# Patient Record
Sex: FEMALE | Race: WHITE | Employment: OTHER | URBAN - METROPOLITAN AREA
[De-identification: names, ages, dates, MRNs, and addresses within clinical notes are randomized per-mention and may not be internally consistent; named-entity substitution may affect disease eponyms.]

---

## 2017-12-12 ENCOUNTER — HOSPITAL ENCOUNTER (OUTPATIENT)
Age: 57
Setting detail: OBSERVATION
Discharge: HOME | End: 2017-12-14
Attending: EMERGENCY MEDICINE | Admitting: INTERNAL MEDICINE
Payer: OTHER GOVERNMENT

## 2017-12-12 ENCOUNTER — APPOINTMENT (OUTPATIENT)
Dept: CT IMAGING | Age: 57
End: 2017-12-12
Attending: EMERGENCY MEDICINE
Payer: OTHER GOVERNMENT

## 2017-12-12 DIAGNOSIS — Z98.890 H/O LITHOTRIPSY: ICD-10-CM

## 2017-12-12 DIAGNOSIS — S37.012A HEMATOMA OF LEFT KIDNEY, INITIAL ENCOUNTER: Primary | ICD-10-CM

## 2017-12-12 LAB
ALBUMIN SERPL-MCNC: 3.7 G/DL (ref 3.5–5)
ALBUMIN/GLOB SERPL: 1.1 {RATIO} (ref 1.1–2.2)
ALP SERPL-CCNC: 91 U/L (ref 45–117)
ALT SERPL-CCNC: 20 U/L (ref 12–78)
ANION GAP SERPL CALC-SCNC: 9 MMOL/L (ref 5–15)
APPEARANCE UR: ABNORMAL
AST SERPL-CCNC: 21 U/L (ref 15–37)
BACTERIA URNS QL MICRO: ABNORMAL /HPF
BASOPHILS # BLD: 0 K/UL (ref 0–0.1)
BASOPHILS NFR BLD: 0 % (ref 0–1)
BILIRUB SERPL-MCNC: 0.8 MG/DL (ref 0.2–1)
BILIRUB UR QL: NEGATIVE
BUN SERPL-MCNC: 16 MG/DL (ref 6–20)
BUN/CREAT SERPL: 17 (ref 12–20)
CALCIUM SERPL-MCNC: 8.6 MG/DL (ref 8.5–10.1)
CHLORIDE SERPL-SCNC: 101 MMOL/L (ref 97–108)
CO2 SERPL-SCNC: 26 MMOL/L (ref 21–32)
COLOR UR: ABNORMAL
CREAT SERPL-MCNC: 0.96 MG/DL (ref 0.55–1.02)
EOSINOPHIL # BLD: 0.2 K/UL (ref 0–0.4)
EOSINOPHIL NFR BLD: 2 % (ref 0–7)
EPITH CASTS URNS QL MICRO: ABNORMAL /LPF
ERYTHROCYTE [DISTWIDTH] IN BLOOD BY AUTOMATED COUNT: 12.3 % (ref 11.5–14.5)
GLOBULIN SER CALC-MCNC: 3.3 G/DL (ref 2–4)
GLUCOSE SERPL-MCNC: 96 MG/DL (ref 65–100)
GLUCOSE UR STRIP.AUTO-MCNC: NEGATIVE MG/DL
HCT VFR BLD AUTO: 25.9 % (ref 35–47)
HGB BLD-MCNC: 8.5 G/DL (ref 11.5–16)
HGB UR QL STRIP: ABNORMAL
KETONES UR QL STRIP.AUTO: ABNORMAL MG/DL
LEUKOCYTE ESTERASE UR QL STRIP.AUTO: ABNORMAL
LYMPHOCYTES # BLD: 2 K/UL (ref 0.8–3.5)
LYMPHOCYTES NFR BLD: 23 % (ref 12–49)
MCH RBC QN AUTO: 30.5 PG (ref 26–34)
MCHC RBC AUTO-ENTMCNC: 32.8 G/DL (ref 30–36.5)
MCV RBC AUTO: 92.8 FL (ref 80–99)
MONOCYTES # BLD: 0.9 K/UL (ref 0–1)
MONOCYTES NFR BLD: 11 % (ref 5–13)
NEUTS SEG # BLD: 5.6 K/UL (ref 1.8–8)
NEUTS SEG NFR BLD: 64 % (ref 32–75)
NITRITE UR QL STRIP.AUTO: NEGATIVE
PH UR STRIP: 6.5 [PH] (ref 5–8)
PLATELET # BLD AUTO: 308 K/UL (ref 150–400)
POTASSIUM SERPL-SCNC: 3.7 MMOL/L (ref 3.5–5.1)
PROT SERPL-MCNC: 7 G/DL (ref 6.4–8.2)
PROT UR STRIP-MCNC: 100 MG/DL
RBC # BLD AUTO: 2.79 M/UL (ref 3.8–5.2)
RBC #/AREA URNS HPF: ABNORMAL /HPF (ref 0–5)
SODIUM SERPL-SCNC: 136 MMOL/L (ref 136–145)
SP GR UR REFRACTOMETRY: 1.01 (ref 1–1.03)
TROPONIN I SERPL-MCNC: <0.04 NG/ML
UA: UC IF INDICATED,UAUC: ABNORMAL
UROBILINOGEN UR QL STRIP.AUTO: 1 EU/DL (ref 0.2–1)
WBC # BLD AUTO: 8.7 K/UL (ref 3.6–11)
WBC URNS QL MICRO: ABNORMAL /HPF (ref 0–4)

## 2017-12-12 PROCEDURE — 74011636320 HC RX REV CODE- 636/320: Performed by: EMERGENCY MEDICINE

## 2017-12-12 PROCEDURE — 36415 COLL VENOUS BLD VENIPUNCTURE: CPT | Performed by: EMERGENCY MEDICINE

## 2017-12-12 PROCEDURE — 96360 HYDRATION IV INFUSION INIT: CPT

## 2017-12-12 PROCEDURE — 81001 URINALYSIS AUTO W/SCOPE: CPT | Performed by: EMERGENCY MEDICINE

## 2017-12-12 PROCEDURE — 80053 COMPREHEN METABOLIC PANEL: CPT | Performed by: EMERGENCY MEDICINE

## 2017-12-12 PROCEDURE — 74176 CT ABD & PELVIS W/O CONTRAST: CPT

## 2017-12-12 PROCEDURE — 99285 EMERGENCY DEPT VISIT HI MDM: CPT

## 2017-12-12 PROCEDURE — 71275 CT ANGIOGRAPHY CHEST: CPT

## 2017-12-12 PROCEDURE — 84484 ASSAY OF TROPONIN QUANT: CPT | Performed by: EMERGENCY MEDICINE

## 2017-12-12 PROCEDURE — 96361 HYDRATE IV INFUSION ADD-ON: CPT

## 2017-12-12 PROCEDURE — 85025 COMPLETE CBC W/AUTO DIFF WBC: CPT | Performed by: EMERGENCY MEDICINE

## 2017-12-12 PROCEDURE — 87086 URINE CULTURE/COLONY COUNT: CPT | Performed by: EMERGENCY MEDICINE

## 2017-12-12 PROCEDURE — 74011250636 HC RX REV CODE- 250/636: Performed by: EMERGENCY MEDICINE

## 2017-12-12 PROCEDURE — 93005 ELECTROCARDIOGRAM TRACING: CPT

## 2017-12-12 RX ORDER — SODIUM CHLORIDE 9 MG/ML
50 INJECTION, SOLUTION INTRAVENOUS
Status: COMPLETED | OUTPATIENT
Start: 2017-12-12 | End: 2017-12-12

## 2017-12-12 RX ORDER — SODIUM CHLORIDE 0.9 % (FLUSH) 0.9 %
10 SYRINGE (ML) INJECTION
Status: COMPLETED | OUTPATIENT
Start: 2017-12-12 | End: 2017-12-12

## 2017-12-12 RX ADMIN — IOPAMIDOL 100 ML: 755 INJECTION, SOLUTION INTRAVENOUS at 22:52

## 2017-12-12 RX ADMIN — Medication 10 ML: at 22:52

## 2017-12-12 RX ADMIN — SODIUM CHLORIDE 50 ML/HR: 900 INJECTION, SOLUTION INTRAVENOUS at 22:52

## 2017-12-12 NOTE — IP AVS SNAPSHOT
[address] [facility] 
[phone] Patient: Chelsey Steen MRN: [id number] EGI:5/5/8876 You are allergic to the following Allergen Reactions Paxil (Paroxetine Hcl) Swelling Recent Documentation Height Weight Breastfeeding? BMI OB Status Smoking Status 1.6 m 84.9 kg No 33.16 kg/m2 Postmenopausal Never Smoker Emergency Contacts  (Rel.) Home Phone Work Phone Mobile Phone Bimal Leigh [phone] -- [phone] About your hospitalization You were admitted on:  December 13, 2017 You last received care in the:  [address] You were discharged on:  December 14, 2017 Why you were hospitalized Your primary diagnosis was:  Not on File Your diagnoses also included:  Uti (Urinary Tract Infection) Providers Seen During Your Hospitalization Provider Specialty Primary office phone Umm Escamilla MD Emergency Medicine [phone] Teddy Bonilla MD Internal Medicine [phone] Your Primary Care Physician (PCP) Primary Care Physician Office Phone Office Fax UNKNOWN, PROVIDER ** None ** ** None ** Follow-up Information Follow up With Details Comments Contact Info Provider Unknown   Patient not available to ask My Medications STOP taking these medications HYDROcodone-acetaminophen 5-325 mg per tablet Commonly known as:  NORCO  
   
  
 ibuprofen 600 mg tablet Commonly known as:  MOTRIN  
   
  
 MACROBID 100 mg capsule Generic drug:  nitrofurantoin (macrocrystal-monohydrate) PYRIDIUM 100 mg tablet Generic drug:  phenazopyridine TAKE these medications as instructed Instructions Each Dose to Equal  
 Morning Noon Evening Bedtime  
 cephALEXin 500 mg capsule Commonly known as:  Carlene Mcqueen Your last dose was: Your next dose is: Take 1 Cap by mouth four (4) times daily for 3 days. 500 mg  
    
   
   
   
  
 COLACE 100 mg capsule Generic drug:  docusate sodium Your last dose was: Your next dose is: Take 100 mg by mouth two (2) times a day. 100 mg  
    
   
   
   
  
 CYMBALTA 60 mg capsule Generic drug:  DULoxetine Your last dose was: Your next dose is: Take 60 mg by mouth daily. 60 mg DETROL LA 4 mg ER capsule Generic drug:  tolterodine ER Your last dose was: Your next dose is: Take 4 mg by mouth as needed. 4 mg  
    
   
   
   
  
 dilTIAZem 30 mg tablet Commonly known as:  CARDIZEM Your last dose was: Your next dose is: Take 30 mg by mouth. 30 mg  
    
   
   
   
  
 gabapentin 300 mg capsule Commonly known as:  NEURONTIN Your last dose was: Your next dose is: Take 300 mg by mouth three (3) times daily. 300 mg  
    
   
   
   
  
 hydroxychloroquine 200 mg tablet Commonly known as:  PLAQUENIL Your last dose was: Your next dose is: Take 400 mg by mouth daily. 400 mg  
    
   
   
   
  
 oxyCODONE-acetaminophen 5-325 mg per tablet Commonly known as:  PERCOCET Your last dose was: Your next dose is: Take 1 Tab by mouth every four (4) hours as needed. Max Daily Amount: 6 Tabs. 1 Tab  
    
   
   
   
  
 synthroid 50 mcg tablet Generic drug:  levothyroxine Your last dose was: Your next dose is: Take  by mouth Daily (before breakfast). traMADol 50 mg tablet Commonly known as:  ULTRAM  
   
Your last dose was: Your next dose is: Take 100 mg by mouth every six (6) hours as needed for Pain. 100 mg  
    
   
   
   
  
 TYLENOL 325 mg tablet Generic drug:  acetaminophen Your last dose was: Your next dose is: Take  by mouth every four (4) hours as needed for Pain. Where to Get Your Medications Information on where to get these meds will be given to you by the nurse or doctor. ! Ask your nurse or doctor about these medications  
  cephALEXin 500 mg capsule  
 oxyCODONE-acetaminophen 5-325 mg per tablet Discharge Instructions HOSPITALIST DISCHARGE INSTRUCTIONS 
 
NAME: Noemy Arora :  1960 MRN:  [id number] Date/Time:  2017 3:17 PM 
 
ADMIT DATE: 2017 DISCHARGE DATE: 2017 · It is important that you take the medication exactly as they are prescribed. · Keep your medication in the bottles provided by the pharmacist and keep a list of the medication names, dosages, and times to be taken in your wallet. · Do not take other medications without consulting your doctor. What to do at [facility] Recommended diet:  Regular Diet Recommended activity: Activity as tolerated If you have questions regarding the hospital related prescriptions or hospital related issues please call [facility] at [phone]. You can always direct your questions to your primary care doctor if you are unable to reach your hospital physician; your PCP works as an extension of your hospital doctor just like your hospital doctor is an extension of your PCP for your time at the hospital [facility]) If you experience any of the following symptoms then please call your primary care physician or return to the emergency room if you cannot get hold of your doctor: 
 
Fever, chills, nausea, vomiting, or persistent diarrhea Worsening weakness or new problems with your speech or balance Dark stools or visible blood in your stools New Leg swelling or shortness of breath as these could be signs of a clot Additional Instructions: Look out for increased back or abdominal pain, fevers or blood in your urine. If this occurs, you can contact [address] [place] [address] Urology or return to the ER. Recommend repeat CT or imaging in 3 month Monitor your BP and call your doctor if it is persistently over 160/95 Information obtained by : 
I understand that if any problems occur once I am at home I am to contact my physician. I understand and acknowledge receipt of the instructions indicated above. Physician's or R.N.'s Signature                                                                  Date/Time Patient or Representative Signature Discharge Orders None [employer] Announcement We are excited to announce that we are making your provider's discharge notes available to you in [employer]. You will see these notes when they are completed and signed by the physician that discharged you from your recent hospital stay. If you have any questions or concerns about any information you see in [employer], please call the Health Information Department where you were seen or reach out to your Primary Care Provider for more information about your plan of care. Introducing [facility]! [facility] introduces [employer] patient portal. Now you can access parts of your medical record, email your doctor's office, and request medication refills online. 1. In your internet browser, go to [email or web address] 2. Click on the First Time User? Click Here link in the Sign In box. You will see the New Member Sign Up page. 3. Enter your [employer] Access Code exactly as it appears below.  You will not need to use this code after youve completed the sign-up process. If you do not sign up before the expiration date, you must request a new code. · [employer] Access Code: [email or web address] Expires: 3/12/2018  6:54 PM 
 
4. Enter the last four digits of your Social Security Number (xxxx) and Date of Birth (mm/dd/yyyy) as indicated and click Submit. You will be taken to the next sign-up page. 5. Create a [employer] ID. This will be your [employer] login ID and cannot be changed, so think of one that is secure and easy to remember. 6. Create a [employer] password. You can change your password at any time. 7. Enter your Password Reset Question and Answer. This can be used at a later time if you forget your password. 8. Enter your e-mail address. You will receive e-mail notification when new information is available in [address] 9. Click Sign Up. You can now view and download portions of your medical record. 10. Click the Download Summary menu link to download a portable copy of your medical information. If you have questions, please visit the Frequently Asked Questions section of the [employer] website. Remember, [employer] is NOT to be used for urgent needs. For medical emergencies, dial 911. Now available from your iPhone and Android! General Information Please provide this summary of care documentation to your next provider. Patient Signature:  ____________________________________________________________ Date:  ____________________________________________________________  
  
Mili Krueger Provider Signature:  ____________________________________________________________ Date:  ____________________________________________________________

## 2017-12-13 PROBLEM — N39.0 UTI (URINARY TRACT INFECTION): Status: ACTIVE | Noted: 2017-12-13

## 2017-12-13 LAB
ATRIAL RATE: 86 BPM
CALCULATED P AXIS, ECG09: -13 DEGREES
CALCULATED R AXIS, ECG10: 30 DEGREES
CALCULATED T AXIS, ECG11: 36 DEGREES
DIAGNOSIS, 93000: NORMAL
HCT VFR BLD AUTO: 26.1 % (ref 35–47)
HGB BLD-MCNC: 8.8 G/DL (ref 11.5–16)
P-R INTERVAL, ECG05: 130 MS
Q-T INTERVAL, ECG07: 380 MS
QRS DURATION, ECG06: 74 MS
QTC CALCULATION (BEZET), ECG08: 454 MS
VENTRICULAR RATE, ECG03: 86 BPM

## 2017-12-13 PROCEDURE — 96361 HYDRATE IV INFUSION ADD-ON: CPT

## 2017-12-13 PROCEDURE — 74011250636 HC RX REV CODE- 250/636: Performed by: INTERNAL MEDICINE

## 2017-12-13 PROCEDURE — 96360 HYDRATION IV INFUSION INIT: CPT

## 2017-12-13 PROCEDURE — 99218 HC RM OBSERVATION: CPT

## 2017-12-13 PROCEDURE — 36415 COLL VENOUS BLD VENIPUNCTURE: CPT | Performed by: INTERNAL MEDICINE

## 2017-12-13 PROCEDURE — 74011250637 HC RX REV CODE- 250/637: Performed by: INTERNAL MEDICINE

## 2017-12-13 PROCEDURE — 85018 HEMOGLOBIN: CPT | Performed by: INTERNAL MEDICINE

## 2017-12-13 PROCEDURE — 74011000258 HC RX REV CODE- 258: Performed by: INTERNAL MEDICINE

## 2017-12-13 RX ORDER — NALOXONE HYDROCHLORIDE 0.4 MG/ML
0.4 INJECTION, SOLUTION INTRAMUSCULAR; INTRAVENOUS; SUBCUTANEOUS AS NEEDED
Status: DISCONTINUED | OUTPATIENT
Start: 2017-12-13 | End: 2017-12-14 | Stop reason: HOSPADM

## 2017-12-13 RX ORDER — LEVOTHYROXINE SODIUM 50 UG/1
TABLET ORAL
COMMUNITY

## 2017-12-13 RX ORDER — GABAPENTIN 300 MG/1
300 CAPSULE ORAL 3 TIMES DAILY
Status: DISCONTINUED | OUTPATIENT
Start: 2017-12-13 | End: 2017-12-14 | Stop reason: HOSPADM

## 2017-12-13 RX ORDER — HYDROCODONE BITARTRATE AND ACETAMINOPHEN 5; 325 MG/1; MG/1
1 TABLET ORAL
COMMUNITY
End: 2017-12-14

## 2017-12-13 RX ORDER — ONDANSETRON 2 MG/ML
4 INJECTION INTRAMUSCULAR; INTRAVENOUS
Status: DISCONTINUED | OUTPATIENT
Start: 2017-12-13 | End: 2017-12-14 | Stop reason: HOSPADM

## 2017-12-13 RX ORDER — IBUPROFEN 600 MG/1
600 TABLET ORAL
COMMUNITY
End: 2017-12-14

## 2017-12-13 RX ORDER — OXYCODONE AND ACETAMINOPHEN 5; 325 MG/1; MG/1
1 TABLET ORAL
Status: DISCONTINUED | OUTPATIENT
Start: 2017-12-13 | End: 2017-12-14 | Stop reason: HOSPADM

## 2017-12-13 RX ORDER — BISACODYL 5 MG
5 TABLET, DELAYED RELEASE (ENTERIC COATED) ORAL DAILY PRN
Status: DISCONTINUED | OUTPATIENT
Start: 2017-12-13 | End: 2017-12-14 | Stop reason: HOSPADM

## 2017-12-13 RX ORDER — ACETAMINOPHEN 325 MG/1
TABLET ORAL
COMMUNITY

## 2017-12-13 RX ORDER — HYDROXYCHLOROQUINE SULFATE 200 MG/1
400 TABLET, FILM COATED ORAL DAILY
COMMUNITY

## 2017-12-13 RX ORDER — GABAPENTIN 300 MG/1
300 CAPSULE ORAL 3 TIMES DAILY
COMMUNITY

## 2017-12-13 RX ORDER — DOCUSATE SODIUM 100 MG/1
100 CAPSULE, LIQUID FILLED ORAL 2 TIMES DAILY
COMMUNITY

## 2017-12-13 RX ORDER — DILTIAZEM HYDROCHLORIDE 30 MG/1
30 TABLET, FILM COATED ORAL
COMMUNITY

## 2017-12-13 RX ORDER — SODIUM CHLORIDE 9 MG/ML
50 INJECTION, SOLUTION INTRAVENOUS CONTINUOUS
Status: DISPENSED | OUTPATIENT
Start: 2017-12-13 | End: 2017-12-13

## 2017-12-13 RX ORDER — PHENAZOPYRIDINE HYDROCHLORIDE 100 MG/1
100 TABLET, FILM COATED ORAL
COMMUNITY
End: 2017-12-14

## 2017-12-13 RX ORDER — OXYBUTYNIN CHLORIDE 5 MG/1
10 TABLET, EXTENDED RELEASE ORAL DAILY
Status: DISCONTINUED | OUTPATIENT
Start: 2017-12-13 | End: 2017-12-14 | Stop reason: HOSPADM

## 2017-12-13 RX ORDER — HYDROMORPHONE HYDROCHLORIDE 2 MG/ML
1 INJECTION, SOLUTION INTRAMUSCULAR; INTRAVENOUS; SUBCUTANEOUS
Status: DISCONTINUED | OUTPATIENT
Start: 2017-12-13 | End: 2017-12-14 | Stop reason: HOSPADM

## 2017-12-13 RX ORDER — DILTIAZEM HYDROCHLORIDE 30 MG/1
30 TABLET, FILM COATED ORAL
Status: DISCONTINUED | OUTPATIENT
Start: 2017-12-13 | End: 2017-12-14 | Stop reason: HOSPADM

## 2017-12-13 RX ORDER — TRAMADOL HYDROCHLORIDE 50 MG/1
100 TABLET ORAL
COMMUNITY

## 2017-12-13 RX ORDER — LEVOTHYROXINE SODIUM 50 UG/1
50 TABLET ORAL
Status: DISCONTINUED | OUTPATIENT
Start: 2017-12-13 | End: 2017-12-14 | Stop reason: HOSPADM

## 2017-12-13 RX ORDER — SODIUM CHLORIDE 0.9 % (FLUSH) 0.9 %
5-10 SYRINGE (ML) INJECTION EVERY 8 HOURS
Status: DISCONTINUED | OUTPATIENT
Start: 2017-12-13 | End: 2017-12-14 | Stop reason: HOSPADM

## 2017-12-13 RX ORDER — DULOXETIN HYDROCHLORIDE 60 MG/1
60 CAPSULE, DELAYED RELEASE ORAL DAILY
COMMUNITY

## 2017-12-13 RX ORDER — HYDROXYCHLOROQUINE SULFATE 200 MG/1
400 TABLET, FILM COATED ORAL DAILY
Status: DISCONTINUED | OUTPATIENT
Start: 2017-12-13 | End: 2017-12-14 | Stop reason: HOSPADM

## 2017-12-13 RX ORDER — SODIUM CHLORIDE 0.9 % (FLUSH) 0.9 %
5-10 SYRINGE (ML) INJECTION AS NEEDED
Status: DISCONTINUED | OUTPATIENT
Start: 2017-12-13 | End: 2017-12-14 | Stop reason: HOSPADM

## 2017-12-13 RX ORDER — HYDROMORPHONE HYDROCHLORIDE 2 MG/ML
1 INJECTION, SOLUTION INTRAMUSCULAR; INTRAVENOUS; SUBCUTANEOUS
Status: DISCONTINUED | OUTPATIENT
Start: 2017-12-13 | End: 2017-12-13

## 2017-12-13 RX ORDER — DOCUSATE SODIUM 100 MG/1
100 CAPSULE, LIQUID FILLED ORAL 2 TIMES DAILY
Status: DISCONTINUED | OUTPATIENT
Start: 2017-12-13 | End: 2017-12-14 | Stop reason: HOSPADM

## 2017-12-13 RX ORDER — ACETAMINOPHEN 325 MG/1
650 TABLET ORAL
Status: DISCONTINUED | OUTPATIENT
Start: 2017-12-13 | End: 2017-12-14 | Stop reason: HOSPADM

## 2017-12-13 RX ORDER — TOLTERODINE 4 MG/1
4 CAPSULE, EXTENDED RELEASE ORAL AS NEEDED
COMMUNITY

## 2017-12-13 RX ORDER — DULOXETIN HYDROCHLORIDE 30 MG/1
60 CAPSULE, DELAYED RELEASE ORAL DAILY
Status: DISCONTINUED | OUTPATIENT
Start: 2017-12-13 | End: 2017-12-14 | Stop reason: HOSPADM

## 2017-12-13 RX ORDER — HYDROMORPHONE HYDROCHLORIDE 2 MG/ML
0.5 INJECTION, SOLUTION INTRAMUSCULAR; INTRAVENOUS; SUBCUTANEOUS
Status: DISCONTINUED | OUTPATIENT
Start: 2017-12-13 | End: 2017-12-13

## 2017-12-13 RX ORDER — NITROFURANTOIN 25; 75 MG/1; MG/1
100 CAPSULE ORAL 2 TIMES DAILY
COMMUNITY
End: 2017-12-14

## 2017-12-13 RX ORDER — HYDROMORPHONE HYDROCHLORIDE 2 MG/ML
1 INJECTION, SOLUTION INTRAMUSCULAR; INTRAVENOUS; SUBCUTANEOUS
Status: DISCONTINUED | OUTPATIENT
Start: 2017-12-13 | End: 2017-12-13 | Stop reason: SDUPTHER

## 2017-12-13 RX ADMIN — GABAPENTIN 300 MG: 300 CAPSULE ORAL at 16:10

## 2017-12-13 RX ADMIN — OXYCODONE HYDROCHLORIDE AND ACETAMINOPHEN 1 TABLET: 5; 325 TABLET ORAL at 23:40

## 2017-12-13 RX ADMIN — DULOXETINE HYDROCHLORIDE 60 MG: 30 CAPSULE, DELAYED RELEASE ORAL at 10:13

## 2017-12-13 RX ADMIN — GABAPENTIN 300 MG: 300 CAPSULE ORAL at 10:13

## 2017-12-13 RX ADMIN — DOCUSATE SODIUM 100 MG: 100 CAPSULE, LIQUID FILLED ORAL at 10:13

## 2017-12-13 RX ADMIN — Medication 10 ML: at 23:43

## 2017-12-13 RX ADMIN — OXYCODONE HYDROCHLORIDE AND ACETAMINOPHEN 1 TABLET: 5; 325 TABLET ORAL at 07:59

## 2017-12-13 RX ADMIN — OXYBUTYNIN CHLORIDE 10 MG: 5 TABLET, FILM COATED, EXTENDED RELEASE ORAL at 10:13

## 2017-12-13 RX ADMIN — GABAPENTIN 300 MG: 300 CAPSULE ORAL at 23:40

## 2017-12-13 RX ADMIN — CEFTRIAXONE 1 G: 1 INJECTION, POWDER, FOR SOLUTION INTRAMUSCULAR; INTRAVENOUS at 16:10

## 2017-12-13 RX ADMIN — DILTIAZEM HYDROCHLORIDE 30 MG: 30 TABLET, FILM COATED ORAL at 04:36

## 2017-12-13 RX ADMIN — HYDROXYCHLOROQUINE SULFATE 400 MG: 200 TABLET, FILM COATED ENTERAL at 10:13

## 2017-12-13 RX ADMIN — OXYCODONE HYDROCHLORIDE AND ACETAMINOPHEN 1 TABLET: 5; 325 TABLET ORAL at 13:29

## 2017-12-13 RX ADMIN — SODIUM CHLORIDE 50 ML/HR: 900 INJECTION, SOLUTION INTRAVENOUS at 04:37

## 2017-12-13 RX ADMIN — OXYCODONE HYDROCHLORIDE AND ACETAMINOPHEN 1 TABLET: 5; 325 TABLET ORAL at 18:03

## 2017-12-13 RX ADMIN — DILTIAZEM HYDROCHLORIDE 30 MG: 30 TABLET, FILM COATED ORAL at 23:40

## 2017-12-13 NOTE — ED NOTES
Oncology nurse not available to take report at this time as she is on phone w/ MD per unit secretary.

## 2017-12-13 NOTE — ED PROVIDER NOTES
EMERGENCY DEPARTMENT HISTORY AND PHYSICAL EXAM      Date: 12/12/2017  Patient Name: Noemy Arora    History of Presenting Illness     Chief Complaint   Patient presents with    Flank Pain     Pt ambulatory to triage with c/o L flank pain-states having lithotripsy on 12/8, has had pain since; has noticed \"a little\" blood in urine since surgery    Fatigue     generalized fatigue, intermittent fevers at home since surgery; last had Tylenol today at 1500     Nausea     intermittent x 2 days     History Provided By: Patient    HPI: Noemy Arora, 62 y.o. female with PMHx significant for fibromyalgia, spinal stenosis, and RA, IBS, presents ambulatory to the ED with cc of gradual onset, intermittent, moderate left flank pain with fatigue,chills, and a low grade fever of 100.9 F that began on 12/9/2017. Pt states that she had a left lithotripsy with stent placement on 12/8/2017 and flew from [place] to [place] the next day. Pt was intubated for the surgery. She was discharged with Tylenol, Norco, and Macrobid, though notes that she has not been taking Norco. Pt reports that the flank pain is exacerbated with deep breathing. She had SOB on 12/9/2017. Yesterday, pt had moderate midsternal chest pain which has now resolved. Pt was told some chest pain would be normal postop. She also notes some hematuria and decreased appetite. Her last BM was today. Pt reports having the a right lithotripsy 1 year ago without any subsequent sxs. She denies a history of MI, DVT/PE. Pt specifically denies current SOB, nausea, vomiting, diarrhea, cough, or chest pain. PCP: PROVIDER UNKNOWN     Social Hx: - Tobacco, - EtOH, - Illicit Drugs    There are no other complaints, changes, or physical findings at this time. Past History     Past Medical History:  No past medical history on file. Past Surgical History:  No past surgical history on file. Family History:  No family history on file.     Social History:  Social History Substance Use Topics    Smoking status: Not on file    Smokeless tobacco: Not on file    Alcohol use No     Allergies: Allergies   Allergen Reactions    Paxil [Paroxetine Hcl] Swelling     Review of Systems   Review of Systems   Constitutional: Positive for appetite change (decreased), chills, fatigue and fever (low grade). HENT: Negative for congestion, rhinorrhea and sore throat. Eyes: Negative for pain, discharge and visual disturbance. Respiratory: Positive for shortness of breath (resolved). Negative for cough, chest tightness and wheezing. Cardiovascular: Positive for chest pain (mid, resolved). Negative for palpitations and leg swelling. Gastrointestinal: Negative for abdominal pain, constipation, diarrhea, nausea and vomiting. Genitourinary: Positive for flank pain (left sided). Negative for dysuria, frequency and hematuria. Musculoskeletal: Negative for arthralgias, back pain and myalgias. Skin: Negative for rash. Neurological: Negative for dizziness, weakness, light-headedness and headaches. Psychiatric/Behavioral: Negative. Physical Exam   Physical Exam   Constitutional: She is oriented to person, place, and time. She appears well-developed and well-nourished. No distress. HENT:   Head: Normocephalic and atraumatic. Eyes: EOM are normal. Right eye exhibits no discharge. Left eye exhibits no discharge. No scleral icterus. Neck: Normal range of motion. Neck supple. No tracheal deviation present. Cardiovascular: Normal rate, regular rhythm, normal heart sounds and intact distal pulses. Exam reveals no gallop and no friction rub. No murmur heard. Pulmonary/Chest: Effort normal and breath sounds normal. No respiratory distress. She has no wheezes. She has no rales. Abdominal: Soft. She exhibits no distension. There is tenderness in the epigastric area and left upper quadrant.    Genitourinary:   Genitourinary Comments: Left flank pain   Musculoskeletal: Normal range of motion. She exhibits no edema. Lymphadenopathy:     She has no cervical adenopathy. Neurological: She is alert and oriented to person, place, and time. No focal neuro deficits   Skin: Skin is warm and dry. No rash noted. Psychiatric: She has a normal mood and affect. Nursing note and vitals reviewed. Diagnostic Study Results     Labs -     Recent Results (from the past 12 hour(s))   URINALYSIS W/ REFLEX CULTURE    Collection Time: 12/12/17  6:13 PM   Result Value Ref Range    Color YELLOW/STRAW      Appearance CLOUDY (A) CLEAR      Specific gravity 1.010 1.003 - 1.030      pH (UA) 6.5 5.0 - 8.0      Protein 100 (A) NEG mg/dL    Glucose NEGATIVE  NEG mg/dL    Ketone TRACE (A) NEG mg/dL    Bilirubin NEGATIVE  NEG      Blood LARGE (A) NEG      Urobilinogen 1.0 0.2 - 1.0 EU/dL    Nitrites NEGATIVE  NEG      Leukocyte Esterase MODERATE (A) NEG      WBC 10-20 0 - 4 /hpf    RBC  0 - 5 /hpf    Epithelial cells FEW FEW /lpf    Bacteria 1+ (A) NEG /hpf    UA:UC IF INDICATED URINE CULTURE ORDERED (A) CNI     CBC WITH AUTOMATED DIFF    Collection Time: 12/12/17  6:14 PM   Result Value Ref Range    WBC 8.7 3.6 - 11.0 K/uL    RBC 2.79 (L) 3.80 - 5.20 M/uL    HGB 8.5 (L) 11.5 - 16.0 g/dL    HCT 25.9 (L) 35.0 - 47.0 %    MCV 92.8 80.0 - 99.0 FL    MCH 30.5 26.0 - 34.0 PG    MCHC 32.8 30.0 - 36.5 g/dL    RDW 12.3 11.5 - 14.5 %    PLATELET 570 675 - 173 K/uL    NEUTROPHILS 64 32 - 75 %    LYMPHOCYTES 23 12 - 49 %    MONOCYTES 11 5 - 13 %    EOSINOPHILS 2 0 - 7 %    BASOPHILS 0 0 - 1 %    ABS. NEUTROPHILS 5.6 1.8 - 8.0 K/UL    ABS. LYMPHOCYTES 2.0 0.8 - 3.5 K/UL    ABS. MONOCYTES 0.9 0.0 - 1.0 K/UL    ABS. EOSINOPHILS 0.2 0.0 - 0.4 K/UL    ABS.  BASOPHILS 0.0 0.0 - 0.1 K/UL   METABOLIC PANEL, COMPREHENSIVE    Collection Time: 12/12/17  6:14 PM   Result Value Ref Range    Sodium 136 136 - 145 mmol/L    Potassium 3.7 3.5 - 5.1 mmol/L    Chloride 101 97 - 108 mmol/L    CO2 26 21 - 32 mmol/L    Anion gap 9 5 - 15 mmol/L    Glucose 96 65 - 100 mg/dL    BUN 16 6 - 20 MG/DL    Creatinine 0.96 0.55 - 1.02 MG/DL    BUN/Creatinine ratio 17 12 - 20      GFR est AA >60 >60 ml/min/1.73m2    GFR est non-AA 60 (L) >60 ml/min/1.73m2    Calcium 8.6 8.5 - 10.1 MG/DL    Bilirubin, total 0.8 0.2 - 1.0 MG/DL    ALT (SGPT) 20 12 - 78 U/L    AST (SGOT) 21 15 - 37 U/L    Alk. phosphatase 91 45 - 117 U/L    Protein, total 7.0 6.4 - 8.2 g/dL    Albumin 3.7 3.5 - 5.0 g/dL    Globulin 3.3 2.0 - 4.0 g/dL    A-G Ratio 1.1 1.1 - 2.2     TROPONIN I    Collection Time: 12/12/17  6:14 PM   Result Value Ref Range    Troponin-I, Qt. <0.04 <0.05 ng/mL   EKG, 12 LEAD, INITIAL    Collection Time: 12/12/17  8:49 PM   Result Value Ref Range    Ventricular Rate 86 BPM    Atrial Rate 86 BPM    P-R Interval 130 ms    QRS Duration 74 ms    Q-T Interval 380 ms    QTC Calculation (Bezet) 454 ms    Calculated P Axis -13 degrees    Calculated R Axis 30 degrees    Calculated T Axis 36 degrees    Diagnosis       Normal sinus rhythm  Normal ECG  No previous ECGs available       Radiologic Studies -     CT Results  (Last 48 hours)               12/12/17 2252  CT ABD PELV WO CONT Final result    Impression:  IMPRESSION:    1. Likely left perirenal hematoma which causes mass effect upon the left kidney. 2. There is a reconstituted double-J ureteral stent in the left renal pelvis and   in the bladder. 3. Nephrolithiasis. 4. Incidental findings as above. Narrative:  EXAM:  CT ABDOMEN PELVIS WITHOUT CONTRAST   INDICATION:  left flank pain, recent lithotripsy with ureteral stent   COMPARISON: None. .   TECHNIQUE:    Unenhanced multislice helical CT was performed from the diaphragm to the   symphysis pubis without intravenous contrast administration. Contiguous 5 mm   axial images were reconstructed and lung and soft tissue windows were generated. Coronal and sagittal reformations were generated.     CT dose reduction was achieved through use of a standardized protocol tailored   for this examination and automatic exposure control for dose modulation. [facility] FINDINGS:   INCIDENTALLY IMAGED CHEST:   Heart/vessels: Within normal limits. Lungs/Pleura: Minimal bibasilar scarring/atelectasis. .   ABDOMEN:   Liver: Within normal limits. Gallbladder/Biliary: Within normal limits. Spleen: Within normal limits. Pancreas: Within normal limits. Adrenals: Within normal limits. Kidneys: There is a crescent of irregular, high attenuation material surrounding   the left kidney with adjacent stranding in the perirenal space. There are a few   calcifications within this high attenuation material. Multiple calculi in the   left renal collecting system. Peritoneum/Mesenteries: Within normal limits. Extraperitoneum: Within normal limits. Gastrointestinal tract: Post surgical changes about the stomach. Minimal   diverticulosis without evidence of diverticulitis   Vascular: Calcifications in the aorta. [facility] PELVIS:   Extraperitoneum: Within normal limits. Ureters: There is a double-J ureteral stent reconstituted within the left renal   collecting system and in the bladder. Bladder: Within normal limits. Reproductive System: Within normal limits. .   MSK:    Levoscoliosis of the lumbar spine with associated degenerative changes. .       12/12/17 7955  CTA CHEST W OR W WO CONT Final result    Impression:  IMPRESSION:    1. No visualized pulmonary embolus. Narrative:  EXAM:  CTA CHEST W OR W WO CONT   INDICATION:   pleuritic chest pain, dyspnea, recent travel and surgery   COMPARISON: None. .   TECHNIQUE:    Precontrast  images were obtained to localize the volume for acquisition. Multislice helical CT arteriography was performed from the diaphragm to the   thoracic inlet during uneventful rapid bolus intravenous contrast   administration. Lung and soft tissue windows were generated.   Coronal and   sagittal images were generated and 3D post processing consisting of coronal   maximum intensity images was performed. CT dose reduction was achieved through use of a standardized protocol tailored   for this examination and automatic exposure control for dose modulation. Makayla Peterson FINDINGS:   CHEST:   Chest wall/thoracic inlet: Within normal limits. Thyroid: Within normal limits. Mediastinum/rainer: Patulous appearance of the esophagus. Heart/vessels: Within normal limits. Lungs/Pleura: Within normal limits. .   MSK:    Nonfocal degenerative changes throughout the spine. .             Medical Decision Making   I am the first provider for this patient. I reviewed the vital signs, available nursing notes, past medical history, past surgical history, family history and social history. Vital Signs-Reviewed the patient's vital signs. Patient Vitals for the past 12 hrs:   Temp Pulse Resp BP SpO2   12/12/17 1803 98.1 °F (36.7 °C) 93 16 138/76 100 %     Pulse Oximetry Analysis - 100% on RA    Cardiac Monitor:   Rate: 93 bpm  Rhythm: Normal Sinus Rhythm      EKG interpretation: 20:49  Rhythm: normal sinus rhythm; and regular . Rate (approx.): 86; Axis: normal; MA interval: normal; QRS interval: normal ; ST/T wave: normal.  Written by Ilsa Hanks ED Scribe, as dictated by Juan A Goodrich MD.    Records Reviewed: Nursing Notes    Provider Notes (Medical Decision Making):   Patient presents to ED with worsening left flank pain since recent lithotripsy and stent placement. CT a/p consistent with perirenal hematoma. CTA negative for PE. Troponin negative. Discussed with urology - will admit for observation, serial hemoglobin; urology will evaluate patient in the morning. ED Course:   Initial assessment performed. The patients presenting problems have been discussed, and they are in agreement with the care plan formulated and outlined with them.   I have encouraged them to ask questions as they arise throughout their visit.    CONSULT NOTE:  11:21 PM  Amanda Ware MD spoke with Dr. Regino Hashimoto,  Specialty: Urology   Discussed pt's hx, disposition, and available diagnostic and imaging results. Reviewed care plans. Consultant recommends hospitalist admission for observation, serial Hb. Will see tomorrow. CONSULT NOTE:  11:23 PM  Amanda Ware MD spoke with Dr. Lucrecia Miller,  Specialty: Hospitalist  Discussed pt's hx, disposition, and available diagnostic and imaging results. Reviewed care plans. Consultant will evaluate the patient for admission. Disposition:  Admit Note:  11:23 PM  Patient is being admitted to the hospital by Dr. Lucrecia Miller. The results of their tests and reasons for their admission have been discussed with them and/or available family. They convey agreement and understanding for the need to be admitted and for their admission diagnosis. Consultation has been made with the inpatient physician specialist for hospitalization. PLAN: Admit to Hospital     Diagnosis     Clinical Impression:   1. Hematoma of left kidney, initial encounter    2. H/O lithotripsy      Attestations:    Attestation: This note is prepared by Dee Zazueta, acting as Scribe for MD Amanad Rios MD: The scribe's documentation has been prepared under my direction and personally reviewed by me in its entirety. I confirm that the note above accurately reflects all work, treatment, procedures, and medical decision making performed by me.

## 2017-12-13 NOTE — PROGRESS NOTES
Rec'd pt on unit with  at bedside. Pt stated 8/10 pain but now 3/10 pain to L flank to abd area. Completed all admission paperwork and assessment. Call bell within reach.     Primary Nurse Malinda Carter RN and Jeaneth Marion RN performed a dual skin assessment on this patient No impairment noted  Tommy score is 23

## 2017-12-13 NOTE — ED NOTES
TRANSFER - OUT REPORT:    Verbal report given to Saleem RAGSDALE(name) on Creta Scriver  being transferred to Oncology(unit) for routine progression of care       Report consisted of patients Situation, Background, Assessment and   Recommendations(SBAR). Information from the following report(s) SBAR, ED Summary, [facility] and Recent Results was reviewed with the receiving nurse. Lines:   Peripheral IV 12/12/17 Left Antecubital (Active)   Site Assessment Clean, dry, & intact 12/12/2017  8:20 PM   Dressing Status Clean, dry, & intact 12/12/2017  8:20 PM        Opportunity for questions and clarification was provided.

## 2017-12-13 NOTE — ED NOTES
Bedside shift change report given to Hailey Robbins (oncoming nurse) by Elle Moore (offgoing nurse). Report included the following information SBAR, ED Summary, MAR and Recent Results.

## 2017-12-13 NOTE — PROGRESS NOTES
Oncology Nursing Communication Tool  3:23 PM  12/13/2017     Bedside shift change report given to Sanjuana Mcfarland RN (incoming nurse) by Collin Miguel RN (outgoing nurse) on [employer]. Report included the following information SBAR. Shift Summary:       Issues for physician to address: Oncology Shift Note   Admission Date 12/12/2017   Admission Diagnosis UTI (urinary tract infection)   Code Status Full Code   Consults IP CONSULT TO UROLOGY      Cardiac Monitoring [] Yes [x] No      Purposeful Hourly Rounding [x] Yes    Yanique Score Total Score: 0   Yanique score 3 or > [] Bed Alarm [] Avasys [] 1:1 sitter [] Patient refused (Place signed refusal form in chart)      Pain Managed [x] Yes [] No    Key Pain Meds             traMADol (ULTRAM) 50 mg tablet  (Taking) Take 100 mg by mouth every six (6) hours as needed for Pain. acetaminophen (TYLENOL) 325 mg tablet  (Taking) Take  by mouth every four (4) hours as needed for Pain. ibuprofen (MOTRIN) 600 mg tablet  (Taking) Take 600 mg by mouth every six (6) hours as needed for Pain. HYDROcodone-acetaminophen (NORCO) 5-325 mg per tablet Take 1 Tab by mouth every six (6) hours as needed for Pain. Influenza Vaccine Received Flu Vaccine for Current Season (usually Sept-March): Yes           Oxygen needs? [] Room air Oxygen @  []1L    []2L    []3L   []4L    []5L   []6L     Use home O2? [] Yes [] No  Perform O2 challenge test using  smartphrase (.oxygenchallenge)      Last bowel movement    bowel movement      Urinary Catheter             LDAs               Peripheral IV 12/12/17 Left Antecubital (Active)   Site Assessment Clean, dry, & intact 12/13/2017 10:56 AM   Phlebitis Assessment 0 12/13/2017 10:56 AM   Infiltration Assessment 0 12/13/2017 10:56 AM   Dressing Status Clean, dry, & intact 12/13/2017 10:56 AM   Dressing Type Tape;Transparent 12/13/2017 10:56 AM   Hub Color/Line Status Pink; Infusing 12/13/2017 10:56 AM Readmission Risk Assessment Tool Score Low Risk            3       Total Score        3 Has Seen PCP in Last 6 Months (Yes=3, No=0)        Criteria that do not apply:    . Living with Significant Other. Assisted Living. LTAC. SNF. or   Rehab    Patient Length of Stay (>5 days = 3)    IP Visits Last 12 Months (1-3=4, 4=9, >4=11)    Pt.  Coverage (Medicare=5 , Medicaid, or Self-Pay=4)    Charlson Comorbidity Score (Age + Comorbid Conditions)       Expected Length of Stay - - -   Actual Length of Stay 1          Ronak Walters RN

## 2017-12-13 NOTE — PROGRESS NOTES
Chart reviewed. Patient seen. She feels better. Repeat Hg pending. BP stable, not tachycardic. Will advance diet.

## 2017-12-13 NOTE — CONSULTS
Requesting Provider: Zane Solomon MD  Reason for Consultation: \"perinephric hematoma with mass effect\"  Pre-existing [facility] Patient:   No                Patient: Mark Shaikh MRN: [id number]  SSN: [id number]    YOB: 1960  Age: 62 y.o. Sex: female     Location: [address] [place] [address]       Code Status: Full Code   PCP: PROVIDER UNKNOWN  - None   Emergency Contact:  Primary Emergency Contact: Jordan Kelley, Home Phone: [phone]   Race/Mu-ism/Language: [facility] / Stan People / [place]   Payor: Payor: Mk Zamora / Plan: Luis Bring DEPENDENTS / Product Type: Saratha Hemp /    Prior Admission Data:         Hospitalized:  Hospital Day: 2 - Admitted 12/12/2017  8:15 PM   POD # * No surgery found *  by * Surgery not found * - Blood Loss: * No surgery found * * Surgery not found *     CONSULTANTS  IP CONSULT TO UROLOGY   ADMISSION DIAGNOSES    ICD-10-CM ICD-9-CM   1. Hematoma of left kidney, initial encounter S37.012A 866.01   2. H/O lithotripsy Z98.890 V15.29         Assessment/Plan:     Perinephric hematoma following L ESWL on 12/8/17 in [place] concerning for UTI    -trend H/H and transfuse PRN  -monitor BP and vital signs  -continue left ureteral stent during acute phase to prevent complicating issues like ureteral obstruction  -ucx pending, agree with empiric abx  -reimage if she develops worsening pain, fever >101.5, abd distension  -if becomes hemodynamically unstable or did not respond appropriately to transfusions, would need IR selective embolization. CC: Flank Pain (Pt ambulatory to triage with c/o L flank pain-states having lithotripsy on 12/8, has had pain since; has noticed \"a little\" blood in urine since surgery); Fatigue (generalized fatigue, intermittent fevers at home since surgery; last had Tylenol today at 1500 ); and Nausea (intermittent x 2 days)   HPI: She is a 62 y.o. female who presented with left flank pain following L ESWL 12/8/17 in [place]. She reports feeling left flank pain and malaise on POD 1. She attempted to manage symptoms with tylenol. She denies taking blood thinning medications like NSAIDs or ASA. She flew to [place] from [address] [place] [address] to care for her mother yesterday. She presented to ER for these persistent symptoms. She had CT which revealed perinephric hematoma. She denies temperature greater than 100.9. She denies frequency, urgency, dysuria, gross hematuria. She does have a feeling of pressure attributed to the stent. The left flank pain has been stable, not worsened since onset. She also reports abdominal distension which she though was related to constipation from narcotics so stopped taking them. Temp (24hrs), Av °F (36.7 °C), Min:97.8 °F (36.6 °C), Max:98.1 °F (36.7 °C)    Creatinine   Date/Time Value Ref Range Status   2017 06:14 PM 0.96 0.55 - 1.02 MG/DL Final     Current Antimicrobial Therapy (168h ago through future)    Ordered     Start Stop    17 0310  cefTRIAXone (ROCEPHIN) 1 g in 0.9% sodium chloride (MBP/ADV) 50 mL  1 g,   IntraVENous,   EVERY 24 HOURS      17 0300 --    17 0308  hydroxychloroquine (PLAQUENIL) tablet 400 mg  400 mg,   Oral,   DAILY     Comments:  OP SIG:Take 400 mg by mouth daily.       17 0900 --        Diet: DIET CLEAR LIQUID -    Urinary Status: Voiding, Bathroom privileges     Labs     Lab Results   Component Value Date/Time    WBC 8.7 2017 06:14 PM    HCT 25.9 2017 06:14 PM    PLATELET 652 8 06:14 PM    Sodium 136 2017 06:14 PM    Potassium 3.7 2017 06:14 PM    Chloride 101 2017 06:14 PM    CO2 26 2017 06:14 PM    BUN 16 2017 06:14 PM    Creatinine 0.96 2017 06:14 PM    Glucose 96 2017 06:14 PM    Calcium 8.6 2017 06:14 PM     UA: Lab Results   Component Value Date/Time    Color YELLOW/STRAW 2017 06:13 PM    Appearance CLOUDY 2017 06:13 PM    Specific gravity 1.010 2017 06:13 PM pH (UA) 6.5 12/12/2017 06:13 PM    Protein 100 12/12/2017 06:13 PM    Glucose NEGATIVE  12/12/2017 06:13 PM    Ketone TRACE 12/12/2017 06:13 PM    Bilirubin NEGATIVE  12/12/2017 06:13 PM    Urobilinogen 1.0 12/12/2017 06:13 PM    Nitrites NEGATIVE  12/12/2017 06:13 PM    Leukocyte Esterase MODERATE 12/12/2017 06:13 PM    Epithelial cells FEW 12/12/2017 06:13 PM    Bacteria 1+ 12/12/2017 06:13 PM    WBC 10-20 12/12/2017 06:13 PM    RBC  12/12/2017 06:13 PM     Imaging     Results for orders placed during the hospital encounter of 12/12/17   CTA CHEST W OR W WO CONT    Narrative EXAM:  CTA CHEST W OR W WO CONT  INDICATION:   pleuritic chest pain, dyspnea, recent travel and surgery  COMPARISON: None. .  TECHNIQUE:   Precontrast  images were obtained to localize the volume for acquisition. Multislice helical CT arteriography was performed from the diaphragm to the  thoracic inlet during uneventful rapid bolus intravenous contrast  administration. Lung and soft tissue windows were generated. Coronal and  sagittal images were generated and 3D post processing consisting of coronal  maximum intensity images was performed. CT dose reduction was achieved through use of a standardized protocol tailored  for this examination and automatic exposure control for dose modulation. Levorn Mercado FINDINGS:  CHEST:  Chest wall/thoracic inlet: Within normal limits. Thyroid: Within normal limits. Mediastinum/rainer: Patulous appearance of the esophagus. Heart/vessels: Within normal limits. Lungs/Pleura: Within normal limits. .  MSK:   Nonfocal degenerative changes throughout the spine. .    Impression IMPRESSION:   1. No visualized pulmonary embolus. US Results (most recent):  No results found for this or any previous visit.     Cultures     All Micro Results     Procedure Component Value Units Date/Time    CULTURE, URINE [[id number]] Collected:  12/12/17 1813    Order Status:  Completed Updated:  12/12/17 2225 Past History: (Complete 2+/3 areas)     Allergies   Allergen Reactions    Paxil [Paroxetine Hcl] Swelling      Current Facility-Administered Medications   Medication Dose Route Frequency    docusate sodium (COLACE) capsule 100 mg  100 mg Oral BID    DULoxetine (CYMBALTA) capsule 60 mg  60 mg Oral DAILY    gabapentin (NEURONTIN) capsule 300 mg  300 mg Oral TID    hydroxychloroquine (PLAQUENIL) tablet 400 mg  400 mg Oral DAILY    levothyroxine (SYNTHROID) tablet 50 mcg  50 mcg Oral ACB    oxybutynin chloride XL (DITROPAN XL) tablet 10 mg  10 mg Oral DAILY    sodium chloride (NS) flush 5-10 mL  5-10 mL IntraVENous Q8H    sodium chloride (NS) flush 5-10 mL  5-10 mL IntraVENous PRN    acetaminophen (TYLENOL) tablet 650 mg  650 mg Oral Q4H PRN    oxyCODONE-acetaminophen (PERCOCET) 5-325 mg per tablet 1 Tab  1 Tab Oral Q4H PRN    naloxone (NARCAN) injection 0.4 mg  0.4 mg IntraVENous PRN    ondansetron (ZOFRAN) injection 4 mg  4 mg IntraVENous Q4H PRN    bisacodyl (DULCOLAX) tablet 5 mg  5 mg Oral DAILY PRN    [START ON 12/14/2017] cefTRIAXone (ROCEPHIN) 1 g in 0.9% sodium chloride (MBP/ADV) 50 mL  1 g IntraVENous Q24H    nitroglycerin (NITROBID) 2 % ointment 1 Inch  1 Inch Topical Q6H PRN    0.9% sodium chloride infusion  50 mL/hr IntraVENous CONTINUOUS    dilTIAZem (CARDIZEM) IR tablet 30 mg  30 mg Oral QHS    HYDROmorphone (DILAUDID) injection 1 mg  1 mg IntraVENous Q3H PRN    Prior to Admission medications    Medication Sig Start Date End Date Taking? Authorizing Provider   levothyroxine (SYNTHROID) 50 mcg tablet Take  by mouth Daily (before breakfast). Yes Phys Other, MD   hydroxychloroquine (PLAQUENIL) 200 mg tablet Take 400 mg by mouth daily. Yes Phys Other, MD   DULoxetine (CYMBALTA) 60 mg capsule Take 60 mg by mouth daily. Yes Phys Other, MD   traMADol (ULTRAM) 50 mg tablet Take 100 mg by mouth every six (6) hours as needed for Pain.    Yes Phys Other, MD   gabapentin (NEURONTIN) 300 mg capsule Take 300 mg by mouth three (3) times daily. Yes Karon Bradley MD   dilTIAZem (CARDIZEM) 30 mg tablet Take 30 mg by mouth. Yes Karon Bradley MD   acetaminophen (TYLENOL) 325 mg tablet Take  by mouth every four (4) hours as needed for Pain. Yes Karon Bradley MD   ibuprofen (MOTRIN) 600 mg tablet Take 600 mg by mouth every six (6) hours as needed for Pain. Yes Karon Bradley MD   tolterodine ER (DETROL LA) 4 mg ER capsule Take 4 mg by mouth as needed. Yes Karon Bradley MD   docusate sodium (COLACE) 100 mg capsule Take 100 mg by mouth two (2) times a day. Yes Karon Bradley MD   HYDROcodone-acetaminophen (NORCO) 5-325 mg per tablet Take 1 Tab by mouth every six (6) hours as needed for Pain. Karon Bradley MD   nitrofurantoin, macrocrystal-monohydrate, (MACROBID) 100 mg capsule Take 100 mg by mouth two (2) times a day. Karon Bradley MD   phenazopyridine (PYRIDIUM) 100 mg tablet Take 100 mg by mouth three (3) times daily (after meals). Karon Bradley MD        PMHx:  has a past medical history of Alopecia; Autoimmune disorder ([facility] [address] Fibromyalgia; Hypothyroidism; IBS (irritable bowel syndrome); Plantar fasciitis of left foot; RA (rheumatoid arthritis) ([facility] [address] Renal stones; Spinal stenosis; and TTS (tarsal tunnel syndrome), left. PSurgHx:  has a past surgical history that includes gastric bypass;  section (); acl reconstruction (Right); and tonsillectomy (). and ESWL  PSocHx:  reports that she has never smoked. She has never used smokeless tobacco. She reports that she does not drink alcohol.    ROS:  (Complete - 10 systems) - positives are bolded : Weightloss (Constitutional), Dry mouth (ENMT), Chest pain (CV), SOB (Respiratory), Constipation (GI), Weakness (MS), Pallor (Skin), TIA Sx (Neuro), Confusion (Psych), Easy bruising (Heme)    Physical Exam: (Comprehesive - 8+ 1995 Systems)     (1) Constitutional:  FIO2:   on SpO2: O2 Sat (%): 100 %  O2 Device: Room air    Patient Vitals for the past 24 hrs:   BP Temp Pulse Resp SpO2 Height Weight   12/13/17 1056 135/81 97.8 °F (36.6 °C) 87 18 100 % - -   12/13/17 0700 131/69 - - - 91 % - -   12/13/17 0600 123/73 - - - 91 % - -   12/13/17 0500 117/75 - - - 94 % - -   12/13/17 0436 141/83 - 83 - - - -   12/13/17 0430 - - - - 97 % - -   12/13/17 0415 141/83 - - - 92 % - -   12/13/17 0400 142/76 - - - 93 % - -   12/13/17 0345 135/76 - - - 94 % - -   12/13/17 0330 147/65 - - - 97 % - -   12/13/17 0315 135/72 - - - 95 % - -   12/13/17 0300 137/74 - - - 92 % - -   12/13/17 0245 130/74 - - - 94 % - -   12/13/17 0230 138/71 - - - 91 % - -   12/13/17 0215 140/76 - - - 90 % - -   12/13/17 0200 135/75 - - - 90 % - -   12/13/17 0145 143/74 - - - 90 % - -   12/13/17 0130 137/70 - - - 90 % - -   12/13/17 0115 124/73 - - - 91 % - -   12/13/17 0030 139/76 - - - 93 % - -   12/13/17 0000 140/75 - - - 98 % - -   12/12/17 2345 - - - - 98 % - -   12/12/17 2330 125/71 - - - 96 % - -   12/12/17 2329 - - - - 98 % - -   12/12/17 2327 131/76 - - - - - -   12/12/17 1803 138/76 98.1 °F (36.7 °C) 93 16 100 % 5' 3\" (1.6 m) 84.9 kg (187 lb 2.7 oz)         Date 12/12/17 0700 - 12/13/17 0659 12/13/17 0700 - 12/14/17 0659   Shift 6031-9462 5361-4436 24 Hour Total 0700-1859 1900-0659 24 Hour Total   I  N  T  A  K  E   I.V.  (mL/kg/hr)    315.8  315.8      Volume (0.9% sodium chloride infusion)    315.8  315.8      Volume (cefTRIAXone (ROCEPHIN) 1 g in 0.9% sodium chloride (MBP/ADV) 50 mL)    0  0    Shift Total  (mL/kg)    315.8  (3.7)  315.8  (3.7)   O  U  T  P  U  T   Shift Total  (mL/kg)         NET    315.8  315.8   Weight (kg) 84.9 84.9 84.9 84.9 84.9 84.9      (2) ENMT:   moist mucous membranes   (3) Respiratory:  breathing easily, pain with deep inspiration   (4) GI:  no abdominal masses, no hepatomegaly, no ventral hernia, stool for occult blood not indicated as urologist.  R flank pain, R abd pain, no flank eccymosis   (5) :   deferred   (6) Lymphatic:  no adenopathy   (7) Muscloskeletal: no gross deformity   (8) Skin:  no rash   (9) Neuro:  no focal deficits    Gen: NAD    Signed By: Ginger Barahona MD  - December 13, 2017

## 2017-12-13 NOTE — H&P
Hospitalist Admission Note    NAME: Jenny Fang   :  1960   MRN:  [id number]     Date/Time:  2017 3:02 AM    Patient PCP: PROVIDER UNKNOWN  ________________________________________________________________________    My assessment of this patient's clinical condition and my plan of care is as follows. Assessment / Plan:  L nephrolithiasis with L perirenal hematoma s/p lithotripsy with stent placement 17:    - CT A/P with likely left perirenal hematoma which causes mass effect upon the left kidney. There is a reconstituted double-J ureteral stent in the left renal pelvis and in the bladder. Nephrolithiasis. - urology consulted for recommendations. Pt also told that her ureteral stent should be removed 5-7 days post-procedure and wonders if it can be removed. - serial Hg  - IV fluids  - will keep on clears for now  - percocet, dilaudid prn pain  UTI, present on admission:  - hold macrobid (taking outpt)  - emperic rocephin  - urine sent for culture  Hypothyroidism: con't synthroid  RA, fibromyalgia:  - con't plaquenil  - con't cymbalta, neurontin  Leg cramps: con't diltiazem at night    Code Status: Full  Surrogate Decision Maker:   DVT Prophylaxis: SCDs    Baseline: independent, [place] [address]. Here to help care for her mother who recently suffered a hip fracture. Subjective:   CHIEF COMPLAINT: L flank pain    HISTORY OF PRESENT ILLNESS:     Jenny Fang is a 62 y.o.  female who presents with above. Pt with lithotripsy last Friday with stent placement. She had this last year without difficulty, but this time experienced severe and worsening pain in her L flank over the last few days. She endorses low-grade fevers at home. She has worsening of her pain when taking a deep breath. She complains of constipation. She passed a large clot of blood in her urine on Saturday.   She has loss of appetite but not nausea. She denies SOB but did have some chest pain over the weekend. We were asked to admit for work up and evaluation of the above problems. Past Medical History:   Diagnosis Date    Alopecia     Autoimmune disorder ([facility]     undetermined    Fibromyalgia     Hypothyroidism     IBS (irritable bowel syndrome)     Plantar fasciitis of left foot     RA (rheumatoid arthritis) ([facility]     Renal stones     R 2016, L 2017    Spinal stenosis     c-spine, l-spine    TTS (tarsal tunnel syndrome), left     foot        Past Surgical History:   Procedure Laterality Date    HX ACL RECONSTRUCTION Right     HX  SECTION      HX GASTRIC BYPASS      sleeve    HX TONSILLECTOMY         Social History   Substance Use Topics    Smoking status: Never Smoker    Smokeless tobacco: Never Used    Alcohol use No        Family History   Problem Relation Age of Onset    Heart Attack Mother     Heart Disease Mother     Heart Disease Father      Allergies   Allergen Reactions    Paxil [Paroxetine Hcl] Swelling        Prior to Admission medications    Medication Sig Start Date End Date Taking? Authorizing Provider   levothyroxine (SYNTHROID) 50 mcg tablet Take  by mouth Daily (before breakfast). Yes Karon Bradley MD   hydroxychloroquine (PLAQUENIL) 200 mg tablet Take 400 mg by mouth daily. Yes Karon Bradley MD   DULoxetine (CYMBALTA) 60 mg capsule Take 60 mg by mouth daily. Yes Karon Bradley MD   traMADol (ULTRAM) 50 mg tablet Take 100 mg by mouth every six (6) hours as needed for Pain. Yes Karon Bradley MD   gabapentin (NEURONTIN) 300 mg capsule Take 300 mg by mouth three (3) times daily. Yes Karon Bradley MD   dilTIAZem (CARDIZEM) 30 mg tablet Take 30 mg by mouth. Yes Karon Bradley MD   acetaminophen (TYLENOL) 325 mg tablet Take  by mouth every four (4) hours as needed for Pain. Yes Karon Bradley MD   ibuprofen (MOTRIN) 600 mg tablet Take 600 mg by mouth every six (6) hours as needed for Pain.    Yes Phys Other, MD   HYDROcodone-acetaminophen (NORCO) 5-325 mg per tablet Take 1 Tab by mouth every six (6) hours as needed for Pain. Yes Karon Bradley MD   nitrofurantoin, macrocrystal-monohydrate, (MACROBID) 100 mg capsule Take 100 mg by mouth two (2) times a day. Yes Karon Bradley MD   tolterodine ER (DETROL LA) 4 mg ER capsule Take 4 mg by mouth as needed. Yes Karon Bradley MD   phenazopyridine (PYRIDIUM) 100 mg tablet Take 100 mg by mouth three (3) times daily (after meals). Yes Karon Bradley MD   docusate sodium (COLACE) 100 mg capsule Take 100 mg by mouth two (2) times a day. Yes Karon Bradley MD       REVIEW OF SYSTEMS:     I am not able to complete the review of systems because:    The patient is intubated and sedated    The patient has altered mental status due to his acute medical problems    The patient has baseline aphasia from prior stroke(s)    The patient has baseline dementia and is not reliable historian    The patient is in acute medical distress and unable to provide information           Total of 12 systems reviewed as follows:       POSITIVE= underlined text  Negative = text not underlined  General:  fever, chills, sweats, generalized weakness, weight loss/gain,      loss of appetite   Eyes:    blurred vision, eye pain, loss of vision, double vision  ENT:    rhinorrhea, pharyngitis   Respiratory:   cough, sputum production, SOB, SEXTON, wheezing, pleuritic pain   Cardiology:   chest pain, palpitations, orthopnea, PND, edema, syncope   Gastrointestinal:  abdominal pain , N/V, diarrhea, dysphagia, constipation, bleeding   Genitourinary:  frequency, urgency, dysuria, hematuria, incontinence   Muskuloskeletal :  arthralgia, myalgia, back pain  Hematology:  easy bruising, nose or gum bleeding, lymphadenopathy   Dermatological: rash, ulceration, pruritis, color change / jaundice  Endocrine:   hot flashes or polydipsia   Neurological:  headache, dizziness, confusion, focal weakness, paresthesia,     Speech difficulties, memory loss, gait difficulty  Psychological: Feelings of anxiety, depression, agitation    Objective:   VITALS:    Visit Vitals    /71    Pulse 93    Temp 98.1 °F (36.7 °C)    Resp 16    Ht 5' 3\" (1.6 m)    Wt 84.9 kg (187 lb 2.7 oz)    SpO2 91%    BMI 33.16 kg/m2       PHYSICAL EXAM:    General:    Alert, cooperative, no distress, appears stated age. HEENT: Atraumatic, anicteric sclerae, pink conjunctivae     No oral ulcers, mucosa moist, throat clear, dentition fair  Neck:  Supple, symmetrical,  thyroid: non tender  Lungs:   Clear to auscultation bilaterally. No Wheezing or Rhonchi. No rales. Chest wall:  No tenderness  No Accessory muscle use. Heart:   Regular  rhythm,  No  murmur   No edema  Abdomen:   Soft, L flank/abdominal tenderness. moderately distended. Bowel sounds diminished  Extremities: No cyanosis. No clubbing,      Skin turgor normal, Capillary refill normal, Radial dial pulse 2+  Skin:     Not pale. Not Jaundiced  No rashes   Psych:  Good insight. Not depressed. Not anxious or agitated. Neurologic: EOMs intact. No facial asymmetry. No aphasia or slurred speech. Symmetrical strength, Sensation grossly intact.  Alert and oriented X 4.     _______________________________________________________________________  Care Plan discussed with:    Comments   Patient x    Family      RN     Care Manager                    Consultant:      _______________________________________________________________________  Expected  Disposition:   Home with Family x   HH/PT/OT/RN    SNF/LTC    JUAN J    ________________________________________________________________________  TOTAL TIME:  39 Minutes    Critical Care Provided     Minutes non procedure based      Comments    x Reviewed previous records   >50% of visit spent in counseling and coordination of care x Discussion with patient and/or family and questions answered ________________________________________________________________________  Signed: Artemio Weaver MD    Procedures: see electronic medical records for all procedures/Xrays and details which were not copied into this note but were reviewed prior to creation of Plan. LAB DATA REVIEWED:    Recent Results (from the past 24 hour(s))   URINALYSIS W/ REFLEX CULTURE    Collection Time: 12/12/17  6:13 PM   Result Value Ref Range    Color YELLOW/STRAW      Appearance CLOUDY (A) CLEAR      Specific gravity 1.010 1.003 - 1.030      pH (UA) 6.5 5.0 - 8.0      Protein 100 (A) NEG mg/dL    Glucose NEGATIVE  NEG mg/dL    Ketone TRACE (A) NEG mg/dL    Bilirubin NEGATIVE  NEG      Blood LARGE (A) NEG      Urobilinogen 1.0 0.2 - 1.0 EU/dL    Nitrites NEGATIVE  NEG      Leukocyte Esterase MODERATE (A) NEG      WBC 10-20 0 - 4 /hpf    RBC  0 - 5 /hpf    Epithelial cells FEW FEW /lpf    Bacteria 1+ (A) NEG /hpf    UA:UC IF INDICATED URINE CULTURE ORDERED (A) CNI     CBC WITH AUTOMATED DIFF    Collection Time: 12/12/17  6:14 PM   Result Value Ref Range    WBC 8.7 3.6 - 11.0 K/uL    RBC 2.79 (L) 3.80 - 5.20 M/uL    HGB 8.5 (L) 11.5 - 16.0 g/dL    HCT 25.9 (L) 35.0 - 47.0 %    MCV 92.8 80.0 - 99.0 FL    MCH 30.5 26.0 - 34.0 PG    MCHC 32.8 30.0 - 36.5 g/dL    RDW 12.3 11.5 - 14.5 %    PLATELET 969 478 - 440 K/uL    NEUTROPHILS 64 32 - 75 %    LYMPHOCYTES 23 12 - 49 %    MONOCYTES 11 5 - 13 %    EOSINOPHILS 2 0 - 7 %    BASOPHILS 0 0 - 1 %    ABS. NEUTROPHILS 5.6 1.8 - 8.0 K/UL    ABS. LYMPHOCYTES 2.0 0.8 - 3.5 K/UL    ABS. MONOCYTES 0.9 0.0 - 1.0 K/UL    ABS. EOSINOPHILS 0.2 0.0 - 0.4 K/UL    ABS.  BASOPHILS 0.0 0.0 - 0.1 K/UL   METABOLIC PANEL, COMPREHENSIVE    Collection Time: 12/12/17  6:14 PM   Result Value Ref Range    Sodium 136 136 - 145 mmol/L    Potassium 3.7 3.5 - 5.1 mmol/L    Chloride 101 97 - 108 mmol/L    CO2 26 21 - 32 mmol/L    Anion gap 9 5 - 15 mmol/L    Glucose 96 65 - 100 mg/dL    BUN 16 6 - 20 MG/DL Creatinine 0.96 0.55 - 1.02 MG/DL    BUN/Creatinine ratio 17 12 - 20      GFR est AA >60 >60 ml/min/1.73m2    GFR est non-AA 60 (L) >60 ml/min/1.73m2    Calcium 8.6 8.5 - 10.1 MG/DL    Bilirubin, total 0.8 0.2 - 1.0 MG/DL    ALT (SGPT) 20 12 - 78 U/L    AST (SGOT) 21 15 - 37 U/L    Alk.  phosphatase 91 45 - 117 U/L    Protein, total 7.0 6.4 - 8.2 g/dL    Albumin 3.7 3.5 - 5.0 g/dL    Globulin 3.3 2.0 - 4.0 g/dL    A-G Ratio 1.1 1.1 - 2.2     TROPONIN I    Collection Time: 12/12/17  6:14 PM   Result Value Ref Range    Troponin-I, Qt. <0.04 <0.05 ng/mL   EKG, 12 LEAD, INITIAL    Collection Time: 12/12/17  8:49 PM   Result Value Ref Range    Ventricular Rate 86 BPM    Atrial Rate 86 BPM    P-R Interval 130 ms    QRS Duration 74 ms    Q-T Interval 380 ms    QTC Calculation (Bezet) 454 ms    Calculated P Axis -13 degrees    Calculated R Axis 30 degrees    Calculated T Axis 36 degrees    Diagnosis       Normal sinus rhythm  Normal ECG  No previous ECGs available

## 2017-12-13 NOTE — ED NOTES
Bedside and Verbal shift change report given to Elsy Curtis  (oncoming nurse) by Jose Sánchez  (offgoing nurse). Report included the following information SBAR, Kardex and ED Summary.

## 2017-12-14 VITALS
TEMPERATURE: 99 F | OXYGEN SATURATION: 98 % | BODY MASS INDEX: 33.16 KG/M2 | SYSTOLIC BLOOD PRESSURE: 116 MMHG | RESPIRATION RATE: 16 BRPM | DIASTOLIC BLOOD PRESSURE: 60 MMHG | WEIGHT: 187.17 LBS | HEIGHT: 63 IN | HEART RATE: 82 BPM

## 2017-12-14 LAB
ANION GAP SERPL CALC-SCNC: 5 MMOL/L (ref 5–15)
BACTERIA SPEC CULT: NORMAL
BUN SERPL-MCNC: 11 MG/DL (ref 6–20)
BUN/CREAT SERPL: 13 (ref 12–20)
CALCIUM SERPL-MCNC: 8.6 MG/DL (ref 8.5–10.1)
CC UR VC: NORMAL
CHLORIDE SERPL-SCNC: 107 MMOL/L (ref 97–108)
CO2 SERPL-SCNC: 27 MMOL/L (ref 21–32)
CREAT SERPL-MCNC: 0.83 MG/DL (ref 0.55–1.02)
ERYTHROCYTE [DISTWIDTH] IN BLOOD BY AUTOMATED COUNT: 12.4 % (ref 11.5–14.5)
GLUCOSE SERPL-MCNC: 115 MG/DL (ref 65–100)
HCT VFR BLD AUTO: 26.6 % (ref 35–47)
HGB BLD-MCNC: 8.7 G/DL (ref 11.5–16)
MCH RBC QN AUTO: 30.5 PG (ref 26–34)
MCHC RBC AUTO-ENTMCNC: 32.7 G/DL (ref 30–36.5)
MCV RBC AUTO: 93.3 FL (ref 80–99)
PLATELET # BLD AUTO: 331 K/UL (ref 150–400)
POTASSIUM SERPL-SCNC: 3.9 MMOL/L (ref 3.5–5.1)
RBC # BLD AUTO: 2.85 M/UL (ref 3.8–5.2)
RETICS/RBC NFR AUTO: 5.7 % (ref 0.7–2.1)
SERVICE CMNT-IMP: NORMAL
SODIUM SERPL-SCNC: 139 MMOL/L (ref 136–145)
WBC # BLD AUTO: 5.8 K/UL (ref 3.6–11)

## 2017-12-14 PROCEDURE — 74011250637 HC RX REV CODE- 250/637: Performed by: INTERNAL MEDICINE

## 2017-12-14 PROCEDURE — 36415 COLL VENOUS BLD VENIPUNCTURE: CPT | Performed by: INTERNAL MEDICINE

## 2017-12-14 PROCEDURE — 96361 HYDRATE IV INFUSION ADD-ON: CPT

## 2017-12-14 PROCEDURE — 80048 BASIC METABOLIC PNL TOTAL CA: CPT | Performed by: INTERNAL MEDICINE

## 2017-12-14 PROCEDURE — 85045 AUTOMATED RETICULOCYTE COUNT: CPT | Performed by: INTERNAL MEDICINE

## 2017-12-14 PROCEDURE — 85027 COMPLETE CBC AUTOMATED: CPT | Performed by: INTERNAL MEDICINE

## 2017-12-14 PROCEDURE — 99218 HC RM OBSERVATION: CPT

## 2017-12-14 RX ORDER — OXYCODONE AND ACETAMINOPHEN 5; 325 MG/1; MG/1
1 TABLET ORAL
Qty: 30 TAB | Refills: 0 | Status: SHIPPED | OUTPATIENT
Start: 2017-12-14

## 2017-12-14 RX ORDER — CEPHALEXIN 500 MG/1
500 CAPSULE ORAL 4 TIMES DAILY
Qty: 12 CAP | Refills: 0 | Status: SHIPPED | OUTPATIENT
Start: 2017-12-14 | End: 2017-12-17

## 2017-12-14 RX ADMIN — GABAPENTIN 300 MG: 300 CAPSULE ORAL at 08:31

## 2017-12-14 RX ADMIN — DULOXETINE HYDROCHLORIDE 60 MG: 30 CAPSULE, DELAYED RELEASE ORAL at 08:30

## 2017-12-14 RX ADMIN — HYDROXYCHLOROQUINE SULFATE 400 MG: 200 TABLET, FILM COATED ENTERAL at 08:31

## 2017-12-14 RX ADMIN — OXYBUTYNIN CHLORIDE 10 MG: 5 TABLET, FILM COATED, EXTENDED RELEASE ORAL at 08:30

## 2017-12-14 RX ADMIN — DOCUSATE SODIUM 100 MG: 100 CAPSULE, LIQUID FILLED ORAL at 08:31

## 2017-12-14 RX ADMIN — OXYCODONE HYDROCHLORIDE AND ACETAMINOPHEN 1 TABLET: 5; 325 TABLET ORAL at 12:05

## 2017-12-14 RX ADMIN — LEVOTHYROXINE SODIUM 50 MCG: 50 TABLET ORAL at 08:30

## 2017-12-14 RX ADMIN — OXYCODONE HYDROCHLORIDE AND ACETAMINOPHEN 1 TABLET: 5; 325 TABLET ORAL at 06:19

## 2017-12-14 NOTE — DISCHARGE SUMMARY
Hospitalist Discharge Summary     Patient ID:  Tico Arenas  [id number]  62 y.o.  1960    PCP on record: PROVIDER UNKNOWN    Admit date: 12/12/2017  Discharge date and time: 12/14/2017      DISCHARGE DIAGNOSIS:    Left perinephric hematoma following Left ESWL 12/08/17 [facility])  Nephrolithiasis  Acute blood loss anemia  UTI, POA  Hypothyroidism  RA  Fibromyalgia       CONSULTATIONS:  IP CONSULT TO UROLOGY    Excerpted HPI from H&P of Rica Oppenheim, MD:  CHIEF COMPLAINT: L flank pain     HISTORY OF PRESENT ILLNESS:     Tico Arenas is a 62 y.o.  female who presents with above. Pt with lithotripsy last Friday with stent placement. She had this last year without difficulty, but this time experienced severe and worsening pain in her L flank over the last few days. She endorses low-grade fevers at home. She has worsening of her pain when taking a deep breath. She complains of constipation. She passed a large clot of blood in her urine on Saturday. She has loss of appetite but not nausea. She denies SOB but did have some chest pain over the weekend    ______________________________________________________________________  DISCHARGE SUMMARY/HOSPITAL COURSE:  for full details see H&P, daily progress notes, labs, consult notes. Left perinephric hematoma following Left ESWL 12/08/17 [facility])  Acute blood loss anemia  UTI, POA  -CT abdomen Likely left perirenal hematoma which causes mass effect upon the left kidney. There is a reconstituted double-J ureteral stent in the left renal pelvis and in the bladder.    -Hg was monitored and remained stable. No transfusion required. Her pain improved and was controlled with oral percocet alone.    -Urine culture had no specific growth but patient was on macrobid PTA. She received a couple of doses of rocephin and will finish a few more days of keflex as OP.    -urology input appreciated.   Original plan was to remove her stent prior to discharge but this was cancelled as patient's insurance requires that her PCP [facility]) make a formal referral first to [facility]. Patient thus, will be discharged and the procedure will be rescheduled as an outpatient.    _______________________________________________________________________  Patient seen and examined by me on discharge day. Pertinent Findings:  Gen:    Not in distress  Chest: Clear lungs  CVS:   Regular rhythm. No edema  Abd:  Soft, not distended, not tender  Neuro:  Alert, Oriented x 4, grossly non focal exam  _______________________________________________________________________  DISCHARGE MEDICATIONS:   Current Discharge Medication List      START taking these medications    Details   oxyCODONE-acetaminophen (PERCOCET) 5-325 mg per tablet Take 1 Tab by mouth every four (4) hours as needed. Max Daily Amount: 6 Tabs. Qty: 30 Tab, Refills: 0      cephALEXin (KEFLEX) 500 mg capsule Take 1 Cap by mouth four (4) times daily for 3 days. Qty: 12 Cap, Refills: 0         CONTINUE these medications which have NOT CHANGED    Details   levothyroxine (SYNTHROID) 50 mcg tablet Take  by mouth Daily (before breakfast). hydroxychloroquine (PLAQUENIL) 200 mg tablet Take 400 mg by mouth daily. DULoxetine (CYMBALTA) 60 mg capsule Take 60 mg by mouth daily. traMADol (ULTRAM) 50 mg tablet Take 100 mg by mouth every six (6) hours as needed for Pain.      gabapentin (NEURONTIN) 300 mg capsule Take 300 mg by mouth three (3) times daily. dilTIAZem (CARDIZEM) 30 mg tablet Take 30 mg by mouth. acetaminophen (TYLENOL) 325 mg tablet Take  by mouth every four (4) hours as needed for Pain.      tolterodine ER (DETROL LA) 4 mg ER capsule Take 4 mg by mouth as needed. docusate sodium (COLACE) 100 mg capsule Take 100 mg by mouth two (2) times a day.          STOP taking these medications       ibuprofen (MOTRIN) 600 mg tablet Comments:   Reason for Stopping: HYDROcodone-acetaminophen (NORCO) 5-325 mg per tablet Comments:   Reason for Stopping:         nitrofurantoin, macrocrystal-monohydrate, (MACROBID) 100 mg capsule Comments:   Reason for Stopping:         phenazopyridine (PYRIDIUM) 100 mg tablet Comments:   Reason for Stopping:               My Recommended Diet, Activity, Wound Care, and follow-up labs are listed in the patient's Discharge Insturctions which I have personally completed and reviewed.   Risk of deterioration: Low    Condition at Discharge:  Stable  _____________________________________________________________________    Disposition  Home with family, no needs  ____________________________________________________________________    Care Plan discussed with:   Patient, Family, RN, Care Manager, Consultant    ____________________________________________________________________    Code Status: Full Code  ____________________________________________________________________      Condition at Discharge:  Stable  _____________________________________________________________________  Follow up with:   PCP : PROVIDER UNKNOWN  Follow-up Information     None              Total time in minutes spent coordinating this discharge (includes going over instructions, follow-up, prescriptions, and preparing report for sign off to her PCP) :  35 minutes    Signed:  Kimberly Rueda MD

## 2017-12-14 NOTE — PROGRESS NOTES
Patient taken to Urology (with EMTALA) for stent removal, but patient needed insurance referral prior to procedure so it could not be done today. Patient returned to hospital and will be discharged home and have stent removal done as outpatient. Care Management Interventions  PCP Verified by CM: Yes [facility] in [place] - patient has requested records transferred to her PCP)  Mode of Transport at Discharge: Other (see comment) ()  Transition of Care Consult (CM Consult): Other (CM chart review)  Physical Therapy Consult: No  Occupational Therapy Consult: No  Speech Therapy Consult: No  Current Support Network: Lives with Spouse, Own Home (Visiting relatives in [place] for holidays - plan to return to [place])  Confirm Follow Up Transport: Family  Plan discussed with Pt/Family/Caregiver: Yes  Discharge Location  Discharge Placement: Home with family assistance     Patient has filled out transfer of records request and Dr. Sudhir Guerrero to communicate with patient's PCP.       Yamilet Kitchen RN, BSN, ACM   - Medical Oncology  [phone]

## 2017-12-14 NOTE — DISCHARGE INSTRUCTIONS
HOSPITALIST DISCHARGE INSTRUCTIONS    NAME: Ankit Villa   :  1960   MRN:  [id number]     Date/Time:  2017 3:17 PM    ADMIT DATE: 2017   DISCHARGE DATE: 2017         · It is important that you take the medication exactly as they are prescribed. · Keep your medication in the bottles provided by the pharmacist and keep a list of the medication names, dosages, and times to be taken in your wallet. · Do not take other medications without consulting your doctor. What to do at Home    Recommended diet:  Regular Diet    Recommended activity: Activity as tolerated      If you have questions regarding the hospital related prescriptions or hospital related issues please call [facility] at [phone]. You can always direct your questions to your primary care doctor if you are unable to reach your hospital physician; your PCP works as an extension of your hospital doctor just like your hospital doctor is an extension of your PCP for your time at the hospital [facility])    If you experience any of the following symptoms then please call your primary care physician or return to the emergency room if you cannot get hold of your doctor:    Fever, chills, nausea, vomiting, or persistent diarrhea  Worsening weakness or new problems with your speech or balance  Dark stools or visible blood in your stools  New Leg swelling or shortness of breath as these could be signs of a clot    Additional Instructions:    Look out for increased back or abdominal pain, fevers or blood in your urine. If this occurs, you can contact [facility] or return to the ER. Recommend repeat CT or imaging in 3 month     Monitor your BP and call your doctor if it is persistently over 160/95              Information obtained by :  I understand that if any problems occur once I am at home I am to contact my physician.     I understand and acknowledge receipt of the instructions indicated above.                                                                                                                                           Physician's or R.N.'s Signature                                                                  Date/Time                                                                                                                                              Patient or Representative Signature

## 2017-12-14 NOTE — PROGRESS NOTES
Urology Progress Note    Patient: Chelsey Steen MRN: [id number]  SSN: [id number]    YOB: 1960  Age: 62 y.o. Sex: female        ADMITTED: 2017 to Teddy Bonilla MD by Buddy Martínez MD for UTI (urinary tract infection)      Chelsey Steen is doing excellent. She reports pain is well controlled on percocet. She has remained afebrile with stable vital signs and stable h/h. She has no complaints. Vitals: Temp (24hrs), Av.1 °F (37.3 °C), Min:98.6 °F (37 °C), Max:99.9 °F (37.7 °C)    Blood pressure 116/60, pulse 82, temperature 99 °F (37.2 °C), resp. rate 16, height 5' 3\" (1.6 m), weight 84.9 kg (187 lb 2.7 oz), SpO2 98 %, not currently breastfeeding. Physical Exam:   NAD  nonlabored breathing    Intake and Output:   1901 -  0700  In: 315.8 [I.V.:315.8]  Out: -      BM over last 24 hrs: No data found. BM over Last Week: No data found. Labs:  CBC:   Lab Results   Component Value Date/Time    WBC 5.8 2017 06:15 AM    HCT 26.6 2017 06:15 AM    PLATELET 995 63/10/4248 06:15 AM     BMP:   Lab Results   Component Value Date/Time    Glucose 115 2017 06:15 AM    Sodium 139 2017 06:15 AM    Potassium 3.9 2017 06:15 AM    Chloride 107 2017 06:15 AM    CO2 27 2017 06:15 AM    BUN 11 2017 06:15 AM    Creatinine 0.83 2017 06:15 AM    Calcium 8.6 2017 06:15 AM           Assessment/Plan:     Active Problems:    UTI (urinary tract infection) (2017)    Perinephric hematoma sp ESWL, vitally stable with stable H/H    Plan:  Ucx grew mixed merlin, continue abx 1-2 more days as stent will be removed today  Discussed warning signs: increased flank/abd pain, fever >101.5, symptoms of anemia. If occur, would need repeat imaging  Will see her in the office today for stent removal.   She plans for longterm follow up with her urologist in [place].   Could consider repeat imaging in 3 months to look for source of bleeding (underlying AML or mass for example) after resolution of hematoma. And may need BP checks periodically to monitor for renovascular hypertension.     Signed By: Beny Gabriel MD - December 14, 2017

## 2017-12-14 NOTE — PROGRESS NOTES
Oncology Nursing Communication Tool  8:06 AM  12/14/2017     Bedside shift change report given to Hector Ross RN (incoming nurse) by Joanne Preston RN (outgoing nurse) on [facility] [employer]. Report included the following information SBAR, Kardex, Procedure Summary, Intake/Output, MAR, Accordion, Recent Results and Med Rec Status. Shift Summary: None      Issues for physician to address: Pt has questions regarding plan to recheck CT. Oncology Shift Note   Admission Date 12/12/2017   Admission Diagnosis UTI (urinary tract infection)   Code Status Full Code   Consults IP CONSULT TO UROLOGY      Cardiac Monitoring [] Yes [x] No      Purposeful Hourly Rounding [x] Yes    Yanique Score Total Score: 1   Yanique score 3 or > [] Bed Alarm [] Avasys [] 1:1 sitter [] Patient refused (Place signed refusal form in chart)      Pain Managed [x] Yes [] No    Key Pain Meds             traMADol (ULTRAM) 50 mg tablet  (Taking) Take 100 mg by mouth every six (6) hours as needed for Pain. acetaminophen (TYLENOL) 325 mg tablet  (Taking) Take  by mouth every four (4) hours as needed for Pain. ibuprofen (MOTRIN) 600 mg tablet  (Taking) Take 600 mg by mouth every six (6) hours as needed for Pain. HYDROcodone-acetaminophen (NORCO) 5-325 mg per tablet Take 1 Tab by mouth every six (6) hours as needed for Pain. Influenza Vaccine Received Flu Vaccine for Current Season (usually Sept-March): Yes           Oxygen needs?  [x] Room air Oxygen @  []1L    []2L    []3L   []4L    []5L   []6L     Use home O2? [] Yes [x] No  Perform O2 challenge test using  smartphrase (.oxygenchallenge)      Last bowel movement Last Bowel Movement Date: 12/13/17  bowel movement      Urinary Catheter             LDAs               Peripheral IV 12/12/17 Left Antecubital (Active)   Site Assessment Clean, dry, & intact 12/14/2017  3:20 AM   Phlebitis Assessment 0 12/14/2017  3:20 AM   Infiltration Assessment 0 12/14/2017  3:20 AM Dressing Status Clean, dry, & intact 12/14/2017  3:20 AM   Dressing Type Tape;Transparent 12/14/2017  3:20 AM   Hub Color/Line Status Pink;Capped;Flushed;Patent 12/14/2017  3:20 AM                         Readmission Risk Assessment Tool Score Low Risk            3       Total Score        3 Has Seen PCP in Last 6 Months (Yes=3, No=0)        Criteria that do not apply:    . Living with Significant Other. Assisted Living. LTAC. SNF. or   Rehab    Patient Length of Stay (>5 days = 3)    IP Visits Last 12 Months (1-3=4, 4=9, >4=11)    Pt.  Coverage (Medicare=5 , Medicaid, or Self-Pay=4)    Charlson Comorbidity Score (Age + Comorbid Conditions)       Expected Length of Stay - - -   Actual Length of Stay 1          Brittani Leyva RN

## 2017-12-14 NOTE — PROGRESS NOTES
Oncology Interdisciplinary rounds were held today to discuss patient plan of care and outcomes. The following members were present: Nursing and Case Management.     ALOS: 1       Plan of Care Discharge Disposition   dISCHARGE TODAY

## 2017-12-14 NOTE — PROGRESS NOTES
Oncology Nursing Communication Tool  8:13 PM  12/13/2017     Bedside shift change report given to Peggy Correia RN (incoming nurse) by Viviane Lacy [facility] (outgoing [facility] Report included the following information SBAR, Kardex, Procedure Summary, Intake/Output, MAR and Recent Results. Shift Summary: Diet advanced to regular- patient tolerated well. HGB results at 8.8. Patient still having some pink tinged urine. Plan to have CT tomorrow. Continuing to provide PRN pain medications. Issues for physician to address: none at this time         Oncology Shift Note   Admission Date 12/12/2017   Admission Diagnosis UTI (urinary tract infection)   Code Status Full Code   Consults IP CONSULT TO UROLOGY      Cardiac Monitoring [] Yes [x] No      Purposeful Hourly Rounding [x] Yes    Yanique Score Total Score: 1   Yanique score 3 or > [] Bed Alarm [] Avasys [] 1:1 sitter [] Patient refused (Place signed refusal form in chart)      Pain Managed [x] Yes [] No    Key Pain Meds             traMADol (ULTRAM) 50 mg tablet  (Taking) Take 100 mg by mouth every six (6) hours as needed for Pain. acetaminophen (TYLENOL) 325 mg tablet  (Taking) Take  by mouth every four (4) hours as needed for Pain. ibuprofen (MOTRIN) 600 mg tablet  (Taking) Take 600 mg by mouth every six (6) hours as needed for Pain. HYDROcodone-acetaminophen (NORCO) 5-325 mg per tablet Take 1 Tab by mouth every six (6) hours as needed for Pain. Influenza Vaccine Received Flu Vaccine for Current Season (usually Sept-March): Yes           Oxygen needs?  [x] Room air Oxygen @  []1L    []2L    []3L   []4L    []5L   []6L     Use home O2? [] Yes [] No  Perform O2 challenge test using  smartphrase (.oxygenchallenge)      Last bowel movement Last Bowel Movement Date: 12/13/17  bowel movement      Urinary Catheter             LDAs               Peripheral IV 12/12/17 Left Antecubital (Active)   Site Assessment Clean, dry, & intact 12/13/2017  7:14 PM   Phlebitis Assessment 0 12/13/2017  4:05 PM   Infiltration Assessment 0 12/13/2017  4:05 PM   Dressing Status Clean, dry, & intact 12/13/2017  4:05 PM   Dressing Type Tape;Transparent 12/13/2017  4:05 PM   Hub Color/Line Status Pink; Infusing 12/13/2017  4:05 PM                         Readmission Risk Assessment Tool Score Low Risk            3       Total Score        3 Has Seen PCP in Last 6 Months (Yes=3, No=0)        Criteria that do not apply:    . Living with Significant Other. Assisted Living. LTAC. SNF. or   Rehab    Patient Length of Stay (>5 days = 3)    IP Visits Last 12 Months (1-3=4, 4=9, >4=11)    Pt.  Coverage (Medicare=5 , Medicaid, or Self-Pay=4)    Charlson Comorbidity Score (Age + Comorbid Conditions)       Expected Length of Stay - - -   Actual Length of Stay 1          Penny Nur RN

## 2017-12-14 NOTE — PROGRESS NOTES
Patient discharged home with  at bedside. IV removed with tip intact. [facility]. Could not do it due to no referel. Patient to obtain refferel from pcp for urology and follow up as outpatient. Discharge instructions, medications, and side effects reviewed and understood.
